# Patient Record
Sex: FEMALE | Race: WHITE | NOT HISPANIC OR LATINO | URBAN - METROPOLITAN AREA
[De-identification: names, ages, dates, MRNs, and addresses within clinical notes are randomized per-mention and may not be internally consistent; named-entity substitution may affect disease eponyms.]

---

## 2023-08-16 ENCOUNTER — HOSPITAL ENCOUNTER (OUTPATIENT)
Dept: DATA CONVERSION | Facility: HOSPITAL | Age: 68
Discharge: HOME | End: 2023-08-16
Payer: MEDICARE

## 2023-08-21 DIAGNOSIS — M81.0 AGE-RELATED OSTEOPOROSIS WITHOUT CURRENT PATHOLOGICAL FRACTURE: ICD-10-CM

## 2023-11-06 ENCOUNTER — APPOINTMENT (OUTPATIENT)
Dept: RADIOLOGY | Facility: CLINIC | Age: 68
End: 2023-11-06

## 2023-11-29 ENCOUNTER — ANCILLARY PROCEDURE (OUTPATIENT)
Dept: RADIOLOGY | Facility: CLINIC | Age: 68
End: 2023-11-29
Payer: MEDICARE

## 2023-11-29 DIAGNOSIS — M81.0 AGE-RELATED OSTEOPOROSIS WITHOUT CURRENT PATHOLOGICAL FRACTURE: ICD-10-CM

## 2023-11-29 PROCEDURE — 77085 DXA BONE DENSITY AXL VRT FX: CPT

## 2024-01-20 DIAGNOSIS — E78.2 MIXED HYPERLIPIDEMIA: Primary | ICD-10-CM

## 2024-01-22 RX ORDER — SIMVASTATIN 20 MG/1
TABLET, FILM COATED ORAL
Qty: 90 TABLET | Refills: 0 | Status: SHIPPED | OUTPATIENT
Start: 2024-01-22 | End: 2024-04-23 | Stop reason: SDUPTHER

## 2024-04-18 DIAGNOSIS — E78.2 MIXED HYPERLIPIDEMIA: ICD-10-CM

## 2024-04-18 RX ORDER — SIMVASTATIN 20 MG/1
TABLET, FILM COATED ORAL
Qty: 90 TABLET | Refills: 0 | OUTPATIENT
Start: 2024-04-18

## 2024-04-23 RX ORDER — SIMVASTATIN 20 MG/1
TABLET, FILM COATED ORAL
Qty: 30 TABLET | Refills: 0 | Status: SHIPPED | OUTPATIENT
Start: 2024-04-23 | End: 2024-05-11

## 2024-04-29 ENCOUNTER — OFFICE VISIT (OUTPATIENT)
Dept: PRIMARY CARE | Facility: CLINIC | Age: 69
End: 2024-04-29
Payer: MEDICARE

## 2024-04-29 ENCOUNTER — LAB (OUTPATIENT)
Dept: LAB | Facility: LAB | Age: 69
End: 2024-04-29
Payer: MEDICARE

## 2024-04-29 VITALS
HEART RATE: 60 BPM | WEIGHT: 225.8 LBS | BODY MASS INDEX: 41.63 KG/M2 | OXYGEN SATURATION: 97 % | SYSTOLIC BLOOD PRESSURE: 122 MMHG | DIASTOLIC BLOOD PRESSURE: 70 MMHG

## 2024-04-29 DIAGNOSIS — E55.9 VITAMIN D DEFICIENCY: ICD-10-CM

## 2024-04-29 DIAGNOSIS — E78.2 MIXED HYPERLIPIDEMIA: ICD-10-CM

## 2024-04-29 DIAGNOSIS — M81.0 AGE-RELATED OSTEOPOROSIS WITHOUT CURRENT PATHOLOGICAL FRACTURE: ICD-10-CM

## 2024-04-29 DIAGNOSIS — F51.01 PRIMARY INSOMNIA: ICD-10-CM

## 2024-04-29 DIAGNOSIS — Z12.31 ENCOUNTER FOR SCREENING MAMMOGRAM FOR BREAST CANCER: ICD-10-CM

## 2024-04-29 DIAGNOSIS — E78.2 MIXED HYPERLIPIDEMIA: Primary | ICD-10-CM

## 2024-04-29 PROBLEM — E66.9 OBESITY: Status: ACTIVE | Noted: 2024-04-29

## 2024-04-29 PROBLEM — G47.00 INSOMNIA: Status: ACTIVE | Noted: 2024-04-29

## 2024-04-29 PROBLEM — E78.5 HYPERLIPIDEMIA: Status: ACTIVE | Noted: 2024-04-29

## 2024-04-29 LAB
ALBUMIN SERPL-MCNC: 4.3 G/DL (ref 3.5–5)
ALP BLD-CCNC: 74 U/L (ref 35–125)
ALT SERPL-CCNC: 21 U/L (ref 5–40)
ANION GAP SERPL CALC-SCNC: 12 MMOL/L
AST SERPL-CCNC: 16 U/L (ref 5–40)
BILIRUB SERPL-MCNC: 0.3 MG/DL (ref 0.1–1.2)
BUN SERPL-MCNC: 18 MG/DL (ref 8–25)
CALCIUM SERPL-MCNC: 10.2 MG/DL (ref 8.5–10.4)
CHLORIDE SERPL-SCNC: 105 MMOL/L (ref 97–107)
CHOLEST SERPL-MCNC: 188 MG/DL (ref 133–200)
CHOLEST/HDLC SERPL: 3 {RATIO}
CO2 SERPL-SCNC: 25 MMOL/L (ref 24–31)
CREAT SERPL-MCNC: 0.8 MG/DL (ref 0.4–1.6)
EGFRCR SERPLBLD CKD-EPI 2021: 80 ML/MIN/1.73M*2
GLUCOSE SERPL-MCNC: 101 MG/DL (ref 65–99)
HDLC SERPL-MCNC: 63 MG/DL
LDLC SERPL CALC-MCNC: 107 MG/DL (ref 65–130)
POTASSIUM SERPL-SCNC: 4.9 MMOL/L (ref 3.4–5.1)
PROT SERPL-MCNC: 6.9 G/DL (ref 5.9–7.9)
SODIUM SERPL-SCNC: 142 MMOL/L (ref 133–145)
TRIGL SERPL-MCNC: 91 MG/DL (ref 40–150)
TSH SERPL DL<=0.05 MIU/L-ACNC: 2.69 MIU/L (ref 0.27–4.2)

## 2024-04-29 PROCEDURE — 1126F AMNT PAIN NOTED NONE PRSNT: CPT | Performed by: INTERNAL MEDICINE

## 2024-04-29 PROCEDURE — 99214 OFFICE O/P EST MOD 30 MIN: CPT | Performed by: INTERNAL MEDICINE

## 2024-04-29 PROCEDURE — 84443 ASSAY THYROID STIM HORMONE: CPT

## 2024-04-29 PROCEDURE — 1157F ADVNC CARE PLAN IN RCRD: CPT | Performed by: INTERNAL MEDICINE

## 2024-04-29 PROCEDURE — 1036F TOBACCO NON-USER: CPT | Performed by: INTERNAL MEDICINE

## 2024-04-29 PROCEDURE — 80061 LIPID PANEL: CPT

## 2024-04-29 PROCEDURE — 36415 COLL VENOUS BLD VENIPUNCTURE: CPT

## 2024-04-29 PROCEDURE — 1159F MED LIST DOCD IN RCRD: CPT | Performed by: INTERNAL MEDICINE

## 2024-04-29 PROCEDURE — 80053 COMPREHEN METABOLIC PANEL: CPT

## 2024-04-29 RX ORDER — CHOLECALCIFEROL (VITAMIN D3) 25 MCG
1000 TABLET ORAL DAILY
COMMUNITY

## 2024-04-29 ASSESSMENT — ENCOUNTER SYMPTOMS
OCCASIONAL FEELINGS OF UNSTEADINESS: 0
ACTIVITY CHANGE: 0
PSYCHIATRIC NEGATIVE: 1
DEPRESSION: 0
COUGH: 0
CARDIOVASCULAR NEGATIVE: 1
CONSTITUTIONAL NEGATIVE: 1
SHORTNESS OF BREATH: 0
CONSTIPATION: 0
DYSURIA: 0
LOSS OF SENSATION IN FEET: 0
DIARRHEA: 0

## 2024-04-29 ASSESSMENT — PATIENT HEALTH QUESTIONNAIRE - PHQ9
2. FEELING DOWN, DEPRESSED OR HOPELESS: NOT AT ALL
SUM OF ALL RESPONSES TO PHQ9 QUESTIONS 1 AND 2: 0
1. LITTLE INTEREST OR PLEASURE IN DOING THINGS: NOT AT ALL

## 2024-04-29 ASSESSMENT — PAIN SCALES - GENERAL: PAINLEVEL: 0-NO PAIN

## 2024-04-29 NOTE — PROGRESS NOTES
Subjective   Patient ID: Felecia Melvin is a 68 y.o. female who presents for Med Refill.    Hyperlipidemia-stable and compliant on meds. Taking simvastatin.  Lab Results       Component                Value               Date                       LDLCALC                  90                  04/19/2023               Osteopenia-last DEXA 9/2023 osteopenia    Exercise-walking to get in shape for [place]    Diet-has cut portions          Review of Systems   Constitutional: Negative.  Negative for activity change.   Respiratory:  Negative for cough and shortness of breath.    Cardiovascular: Negative.         Able to do what she wants   Gastrointestinal:  Negative for constipation and diarrhea.   Genitourinary:  Negative for dysuria.   Psychiatric/Behavioral: Negative.         Objective   /70 (BP Location: Left arm, Patient Position: Sitting)   Pulse 60   Wt 102 kg (225 lb 12.8 oz)   SpO2 97%   BMI 41.63 kg/m²     Physical Exam  Vitals reviewed.   Constitutional:       General: She is not in acute distress.     Appearance: Normal appearance.   Cardiovascular:      Rate and Rhythm: Normal rate and regular rhythm.      Heart sounds: Normal heart sounds. No murmur heard.     No gallop.   Pulmonary:      Breath sounds: Normal breath sounds. No wheezing, rhonchi or rales.   Skin:     General: Skin is warm and dry.      Findings: No rash.   Neurological:      General: No focal deficit present.      Mental Status: She is alert and oriented to person, place, and time.   Psychiatric:         Mood and Affect: Mood normal.       Assessment/Plan   Diagnoses and all orders for this visit:  Mixed hyperlipidemia  -     Comprehensive Metabolic Panel; Future  -     Lipid Panel; Future  -     TSH with reflex to Free T4 if abnormal; Future  Age-related osteoporosis without current pathological fracture  Vitamin D deficiency  Primary insomnia  Encounter for screening mammogram for breast cancer  -     BI mammo bilateral screening  tomosynthesis; Future    Will continue regimen til see labs

## 2024-05-02 ENCOUNTER — HOSPITAL ENCOUNTER (OUTPATIENT)
Dept: RADIOLOGY | Facility: CLINIC | Age: 69
Discharge: HOME | End: 2024-05-02
Payer: MEDICARE

## 2024-05-02 VITALS — WEIGHT: 224.87 LBS | HEIGHT: 62 IN | BODY MASS INDEX: 41.38 KG/M2

## 2024-05-02 DIAGNOSIS — Z12.31 ENCOUNTER FOR SCREENING MAMMOGRAM FOR BREAST CANCER: ICD-10-CM

## 2024-05-02 PROCEDURE — 77067 SCR MAMMO BI INCL CAD: CPT

## 2024-05-02 PROCEDURE — 77067 SCR MAMMO BI INCL CAD: CPT | Performed by: RADIOLOGY

## 2024-05-02 PROCEDURE — 77063 BREAST TOMOSYNTHESIS BI: CPT | Performed by: RADIOLOGY

## 2024-05-06 DIAGNOSIS — Z12.31 ENCOUNTER FOR SCREENING MAMMOGRAM FOR MALIGNANT NEOPLASM OF BREAST: ICD-10-CM

## 2024-05-10 DIAGNOSIS — E78.2 MIXED HYPERLIPIDEMIA: ICD-10-CM

## 2024-05-11 RX ORDER — SIMVASTATIN 20 MG/1
TABLET, FILM COATED ORAL
Qty: 90 TABLET | Refills: 3 | Status: SHIPPED | OUTPATIENT
Start: 2024-05-11

## 2025-04-28 ENCOUNTER — OFFICE VISIT (OUTPATIENT)
Dept: PRIMARY CARE | Facility: CLINIC | Age: 70
End: 2025-04-28
Payer: MEDICARE

## 2025-04-28 VITALS
BODY MASS INDEX: 40.93 KG/M2 | HEART RATE: 68 BPM | SYSTOLIC BLOOD PRESSURE: 128 MMHG | WEIGHT: 223.8 LBS | OXYGEN SATURATION: 97 % | DIASTOLIC BLOOD PRESSURE: 78 MMHG

## 2025-04-28 DIAGNOSIS — E78.2 MIXED HYPERLIPIDEMIA: Primary | ICD-10-CM

## 2025-04-28 DIAGNOSIS — M81.0 AGE-RELATED OSTEOPOROSIS WITHOUT CURRENT PATHOLOGICAL FRACTURE: ICD-10-CM

## 2025-04-28 DIAGNOSIS — E66.813 CLASS 3 SEVERE OBESITY DUE TO EXCESS CALORIES WITHOUT SERIOUS COMORBIDITY WITH BODY MASS INDEX (BMI) OF 40.0 TO 44.9 IN ADULT: ICD-10-CM

## 2025-04-28 DIAGNOSIS — E55.9 VITAMIN D DEFICIENCY: ICD-10-CM

## 2025-04-28 LAB
ALBUMIN SERPL-MCNC: 4.4 G/DL (ref 3.6–5.1)
ALP SERPL-CCNC: 59 U/L (ref 37–153)
ALT SERPL-CCNC: 14 U/L (ref 6–29)
ANION GAP SERPL CALCULATED.4IONS-SCNC: 8 MMOL/L (CALC) (ref 7–17)
AST SERPL-CCNC: 13 U/L (ref 10–35)
BILIRUB SERPL-MCNC: 0.4 MG/DL (ref 0.2–1.2)
BUN SERPL-MCNC: 18 MG/DL (ref 7–25)
CALCIUM SERPL-MCNC: 9.5 MG/DL (ref 8.6–10.4)
CHLORIDE SERPL-SCNC: 106 MMOL/L (ref 98–110)
CHOLEST SERPL-MCNC: 175 MG/DL
CHOLEST/HDLC SERPL: 2.8 (CALC)
CO2 SERPL-SCNC: 24 MMOL/L (ref 20–32)
CREAT SERPL-MCNC: 0.69 MG/DL (ref 0.5–1.05)
EGFRCR SERPLBLD CKD-EPI 2021: 94 ML/MIN/1.73M2
GLUCOSE SERPL-MCNC: 105 MG/DL (ref 65–99)
HDLC SERPL-MCNC: 62 MG/DL
LDLC SERPL CALC-MCNC: 94 MG/DL (CALC)
NONHDLC SERPL-MCNC: 113 MG/DL (CALC)
POTASSIUM SERPL-SCNC: 4.4 MMOL/L (ref 3.5–5.3)
PROT SERPL-MCNC: 7.1 G/DL (ref 6.1–8.1)
SODIUM SERPL-SCNC: 138 MMOL/L (ref 135–146)
TRIGL SERPL-MCNC: 96 MG/DL

## 2025-04-28 PROCEDURE — 1157F ADVNC CARE PLAN IN RCRD: CPT | Performed by: INTERNAL MEDICINE

## 2025-04-28 PROCEDURE — 1126F AMNT PAIN NOTED NONE PRSNT: CPT | Performed by: INTERNAL MEDICINE

## 2025-04-28 PROCEDURE — 1159F MED LIST DOCD IN RCRD: CPT | Performed by: INTERNAL MEDICINE

## 2025-04-28 PROCEDURE — 1036F TOBACCO NON-USER: CPT | Performed by: INTERNAL MEDICINE

## 2025-04-28 PROCEDURE — 99214 OFFICE O/P EST MOD 30 MIN: CPT | Performed by: INTERNAL MEDICINE

## 2025-04-28 ASSESSMENT — PATIENT HEALTH QUESTIONNAIRE - PHQ9
2. FEELING DOWN, DEPRESSED OR HOPELESS: NOT AT ALL
1. LITTLE INTEREST OR PLEASURE IN DOING THINGS: NOT AT ALL
SUM OF ALL RESPONSES TO PHQ9 QUESTIONS 1 AND 2: 0

## 2025-04-28 ASSESSMENT — ENCOUNTER SYMPTOMS
COUGH: 0
LOSS OF SENSATION IN FEET: 0
SHORTNESS OF BREATH: 0
OCCASIONAL FEELINGS OF UNSTEADINESS: 0
CONSTIPATION: 0
ACTIVITY CHANGE: 0
PSYCHIATRIC NEGATIVE: 1
DEPRESSION: 0
CONSTITUTIONAL NEGATIVE: 1
DIARRHEA: 0
CARDIOVASCULAR NEGATIVE: 1
DYSURIA: 0

## 2025-04-28 ASSESSMENT — PAIN SCALES - GENERAL: PAINLEVEL_OUTOF10: 0-NO PAIN

## 2025-04-28 NOTE — LETTER
April 29, 2025     Felecia Melvin  [address] [place] [address]      Dear Ms. Melvin:    Below are the results from your recent visit: Labs good     Resulted Orders   Comprehensive Metabolic Panel   Result Value Ref Range    GLUCOSE 105 (H) 65 - 99 mg/dL      Comment:                    Fasting reference interval     For someone without known diabetes, a glucose value  between 100 and 125 mg/dL is consistent with  prediabetes and should be confirmed with a  follow-up test.         UREA NITROGEN (BUN) 18 7 - 25 mg/dL    CREATININE 0.69 0.50 - 1.05 mg/dL    EGFR 94 > OR = 60 mL/min/1.73m2    SODIUM 138 135 - 146 mmol/L    POTASSIUM 4.4 3.5 - 5.3 mmol/L    CHLORIDE 106 98 - 110 mmol/L    CARBON DIOXIDE 24 20 - 32 mmol/L    ELECTROLYTE BALANCE 8 7 - 17 mmol/L (calc)    CALCIUM 9.5 8.6 - 10.4 mg/dL    PROTEIN, TOTAL 7.1 6.1 - 8.1 g/dL    ALBUMIN 4.4 3.6 - 5.1 g/dL    BILIRUBIN, TOTAL 0.4 0.2 - 1.2 mg/dL    ALKALINE PHOSPHATASE 59 37 - 153 U/L    AST 13 10 - 35 U/L    ALT 14 6 - 29 U/L    Narrative    FASTING:YES    FASTING: YES   Lipid Panel   Result Value Ref Range    CHOLESTEROL, TOTAL 175 <200 mg/dL    HDL CHOLESTEROL 62 > OR = 50 mg/dL    TRIGLYCERIDES 96 <150 mg/dL    LDL-CHOLESTEROL 94 mg/dL (calc)      Comment:      Reference range: <100     Desirable range <100 mg/dL for primary prevention;    <70 mg/dL for patients with CHD or diabetic patients   with > or = 2 CHD risk factors.     LDL-C is now calculated using the Meliton-Phoebe   calculation, which is a validated novel method providing   better accuracy than the Friedewald equation in the   estimation of LDL-C.   Meliton CARRASCO et al. REECE. 2013;310(19): 8788-1265   (http://education.[employer]/faq/YQN822)      CHOL/HDLC RATIO 2.8 <5.0 (calc)    NON HDL CHOLESTEROL 113 <130 mg/dL (calc)      Comment:      For patients with diabetes plus 1 major ASCVD risk   factor, treating to a non-HDL-C goal of <100 mg/dL   (LDL-C of <70 mg/dL) is considered a  therapeutic   option.      Narrative    FASTING:YES    FASTING: YES     The test results show that your current treatment is working. Please continue your current medication and plan.     If you have any questions or concerns, please don't hesitate to call.    Sincerely,    Lacie Lira MD and Staff

## 2025-04-28 NOTE — PROGRESS NOTES
Subjective   Patient ID: Felecia Melvin is a 69 y.o. female who presents for yearly appt - med refill.    Hyperlipidemia-stable and compliant on meds. Taking simvastatin. Lab Results       Component                Value               Date                       LDLCALC                  107                 04/29/2024               Osteopenia-last DEXA 9/2023 osteopenia    Exercise-walking, discussed light weights;    Diet-has cut portions--trying to watch carbs         Review of Systems   Constitutional: Negative.  Negative for activity change.   Respiratory:  Negative for cough and shortness of breath.    Cardiovascular: Negative.         Able to do what she wants   Gastrointestinal:  Negative for constipation and diarrhea.   Genitourinary:  Negative for dysuria.   Psychiatric/Behavioral: Negative.         Objective   /78 (BP Location: Left arm, Patient Position: Sitting)   Pulse 68   Wt 102 kg (223 lb 12.8 oz)   SpO2 97%   BMI 40.93 kg/m²     Physical Exam  Vitals reviewed.   Constitutional:       General: She is not in acute distress.     Appearance: Normal appearance.   Cardiovascular:      Rate and Rhythm: Normal rate and regular rhythm.      Heart sounds: Normal heart sounds. No murmur heard.     No gallop.   Pulmonary:      Breath sounds: Normal breath sounds. No wheezing, rhonchi or rales.   Skin:     General: Skin is warm and dry.      Findings: No rash.   Neurological:      General: No focal deficit present.      Mental Status: She is alert and oriented to person, place, and time.   Psychiatric:         Mood and Affect: Mood normal.         Assessment/Plan  will check labs, continue regimen  Diagnoses and all orders for this visit:  Mixed hyperlipidemia  -     Comprehensive Metabolic Panel; Future  -     Lipid Panel; Future  Vitamin D deficiency  Age-related osteoporosis without current pathological fracture  Class 3 severe obesity due to excess calories without serious comorbidity with body mass  index (BMI) of 40.0 to 44.9 in adult  Other orders  -     Follow Up In Primary Care - Medicare Annual; Future    Current Medications[1]           [1]   Current Outpatient Medications:     calcium carbonate (CALCIUM 500 ORAL), Take 500 mg by mouth 3 times a day., Disp: , Rfl:     cholecalciferol (Vitamin D3) 25 MCG (1000 UT) tablet, Take 1 tablet (1,000 Units) by mouth once daily., Disp: , Rfl:     simvastatin (Zocor) 20 mg tablet, TAKE 1 TABLET BY MOUTH EVERY DAY IN THE EVENING FOR 90 DAYS, Disp: 90 tablet, Rfl: 3

## 2025-05-01 DIAGNOSIS — E78.2 MIXED HYPERLIPIDEMIA: ICD-10-CM

## 2025-05-01 RX ORDER — SIMVASTATIN 20 MG/1
TABLET, FILM COATED ORAL
Qty: 90 TABLET | Refills: 3 | Status: SHIPPED | OUTPATIENT
Start: 2025-05-01

## 2025-05-06 ENCOUNTER — APPOINTMENT (OUTPATIENT)
Dept: RADIOLOGY | Facility: CLINIC | Age: 70
End: 2025-05-06
Payer: MEDICARE

## 2025-05-06 VITALS — BODY MASS INDEX: 41.22 KG/M2 | WEIGHT: 224 LBS | HEIGHT: 62 IN

## 2025-05-06 DIAGNOSIS — Z12.31 ENCOUNTER FOR SCREENING MAMMOGRAM FOR MALIGNANT NEOPLASM OF BREAST: ICD-10-CM

## 2025-05-06 PROCEDURE — 77067 SCR MAMMO BI INCL CAD: CPT | Performed by: RADIOLOGY

## 2025-05-06 PROCEDURE — 77063 BREAST TOMOSYNTHESIS BI: CPT | Performed by: RADIOLOGY

## 2025-05-06 PROCEDURE — 77067 SCR MAMMO BI INCL CAD: CPT

## 2025-06-04 ENCOUNTER — HOSPITAL ENCOUNTER (OUTPATIENT)
Dept: RADIOLOGY | Facility: HOSPITAL | Age: 70
Discharge: HOME | End: 2025-06-04
Payer: MEDICARE

## 2025-06-04 DIAGNOSIS — R92.8 OTHER ABNORMAL AND INCONCLUSIVE FINDINGS ON DIAGNOSTIC IMAGING OF BREAST: ICD-10-CM

## 2025-06-04 PROCEDURE — 77065 DX MAMMO INCL CAD UNI: CPT | Mod: LEFT SIDE | Performed by: STUDENT IN AN ORGANIZED HEALTH CARE EDUCATION/TRAINING PROGRAM

## 2025-06-04 PROCEDURE — 77065 DX MAMMO INCL CAD UNI: CPT | Mod: LT

## 2025-06-04 PROCEDURE — G0279 TOMOSYNTHESIS, MAMMO: HCPCS | Mod: LEFT SIDE | Performed by: STUDENT IN AN ORGANIZED HEALTH CARE EDUCATION/TRAINING PROGRAM

## 2025-06-04 NOTE — LETTER
June 5, 2025     Felecia Melvin  [address] [place] [address]      Dear Ms. Melvin:    Below are the results from your recent visit:  Negative-repeat 1 year   Resulted Orders   BI mammo left diagnostic tomosynthesis    Narrative    Interpreted By:  Marv Dillon,   STUDY:  BI MAMMO LEFT DIAGNOSTIC TOMOSYNTHESIS;  6/4/2025 3:03 pm      ACCESSION NUMBER(S):  [id number]      ORDERING CLINICIAN:  DRE SANABRIA      INDICATION:  Callback from screening mammography for a focal asymmetry in the left  breast.      ,R92.8 Other abnormal and inconclusive findings on diagnostic imaging  of breast      COMPARISON:  05/06/2025 and all relevant prior breast imaging exams available at  the time of dictation.      FINDINGS:  MAMMOGRAPHY: 2D and tomosynthesis images were reviewed at 1 mm slice  thickness.      Density:  There are scattered areas of fibroglandular density.      The focal asymmetry questioned on screening mammogram in upper-outer  quadrant resolves into fibrofatty tissue on additional mammographic  views, consistent with superimposition of benign fibroglandular  tissues. The tissue pattern is similar to prior exams. There are  stable benign calcifications. No suspicious masses, architectural  distortion or calcifications are identified in the left breast.        Impression    Resolution of the questioned finding on screening mammogram. No  mammographic evidence of malignancy in the left breast.      BI-RADS CATEGORY:  BI-RADS Category:  2 Benign.  Recommendation:  Annual Screening.  Recommended Date:  11 Months.  Laterality:  Bilateral.      For any future breast imaging appointments, please call [phone]).          MACRO:  None      Signed by: Marv Dillon 6/4/2025 6:27 PM  Dictation workstation:   [id number]     The test results show that your current treatment is working. Please continue your current medication and plan. We recommend that you repeat the above test(s) in 1 year.    If  you have any questions or concerns, please don't hesitate to call.         Sincerely,        Lacie Lira M.D. and staff.

## 2025-06-05 DIAGNOSIS — Z12.31 ENCOUNTER FOR SCREENING MAMMOGRAM FOR MALIGNANT NEOPLASM OF BREAST: ICD-10-CM
